# Patient Record
Sex: FEMALE | Race: WHITE | NOT HISPANIC OR LATINO | Employment: FULL TIME | URBAN - METROPOLITAN AREA
[De-identification: names, ages, dates, MRNs, and addresses within clinical notes are randomized per-mention and may not be internally consistent; named-entity substitution may affect disease eponyms.]

---

## 2019-01-14 ENCOUNTER — HOSPITAL ENCOUNTER (OUTPATIENT)
Dept: GASTROENTEROLOGY | Facility: HOSPITAL | Age: 49
Setting detail: HOSPITAL OUTPATIENT SURGERY
Discharge: HOME | End: 2019-01-14
Attending: INTERNAL MEDICINE

## 2019-01-14 LAB — HCG UR QL: NEGATIVE

## 2019-01-29 ENCOUNTER — CONVERSION ENCOUNTER (OUTPATIENT)
Dept: FAMILY MEDICINE CLINIC | Facility: CLINIC | Age: 49
End: 2019-01-29

## 2019-01-29 ENCOUNTER — OFFICE VISIT CONVERTED (OUTPATIENT)
Dept: FAMILY MEDICINE CLINIC | Facility: CLINIC | Age: 49
End: 2019-01-29
Attending: FAMILY MEDICINE

## 2019-02-12 ENCOUNTER — HOSPITAL ENCOUNTER (OUTPATIENT)
Dept: OTHER | Facility: HOSPITAL | Age: 49
Setting detail: RECURRING SERIES
Discharge: HOME | End: 2019-03-22
Attending: FAMILY MEDICINE

## 2019-04-30 ENCOUNTER — OFFICE VISIT CONVERTED (OUTPATIENT)
Dept: FAMILY MEDICINE CLINIC | Facility: CLINIC | Age: 49
End: 2019-04-30
Attending: FAMILY MEDICINE

## 2019-05-07 ENCOUNTER — HOSPITAL ENCOUNTER (OUTPATIENT)
Dept: MRI IMAGING | Facility: HOSPITAL | Age: 49
Discharge: HOME | End: 2019-05-07
Attending: FAMILY MEDICINE

## 2019-05-13 ENCOUNTER — HOSPITAL ENCOUNTER (OUTPATIENT)
Dept: FAMILY MEDICINE CLINIC | Facility: CLINIC | Age: 49
Discharge: HOME | End: 2019-05-13
Attending: FAMILY MEDICINE

## 2019-05-13 LAB
BASOPHILS # BLD AUTO: 0.04 10*3/UL (ref 0–0.2)
BASOPHILS NFR BLD AUTO: 0.8 % (ref 0–3)
CONV ABS IMM GRAN: 0.02 10*3/UL (ref 0–0.2)
CONV IMMATURE GRAN: 0.4 % (ref 0–1.8)
DEPRECATED RDW RBC AUTO: 50.7 FL (ref 36.4–46.3)
EOSINOPHIL # BLD AUTO: 0.1 10*3/UL (ref 0–0.7)
EOSINOPHIL # BLD AUTO: 2.1 % (ref 0–7)
ERYTHROCYTE [DISTWIDTH] IN BLOOD BY AUTOMATED COUNT: 13.9 % (ref 11.7–14.4)
FSH SERPL-ACNC: 13.3 M[IU]/ML
HBA1C MFR BLD: 13.8 G/DL (ref 12–16)
HCT VFR BLD AUTO: 42.6 % (ref 37–47)
LYMPHOCYTES # BLD AUTO: 1.14 10*3/UL (ref 1–5)
MCH RBC QN AUTO: 31.9 PG (ref 27–31)
MCHC RBC AUTO-ENTMCNC: 32.4 G/DL (ref 33–37)
MCV RBC AUTO: 98.6 FL (ref 81–99)
MONOCYTES # BLD AUTO: 0.55 10*3/UL (ref 0.2–1.2)
MONOCYTES NFR BLD AUTO: 11.4 % (ref 3–10)
NEUTROPHILS # BLD AUTO: 2.99 10*3/UL (ref 2–8)
NEUTROPHILS NFR BLD AUTO: 61.7 % (ref 30–85)
NRBC CBCN: 0 % (ref 0–0.7)
PLATELET # BLD AUTO: 283 10*3/UL (ref 130–400)
PMV BLD AUTO: 11.1 FL (ref 9.4–12.3)
RBC # BLD AUTO: 4.32 10*6/UL (ref 4.2–5.4)
VARIANT LYMPHS NFR BLD MANUAL: 23.6 % (ref 20–45)
WBC # BLD AUTO: 4.84 10*3/UL (ref 4.8–10.8)

## 2019-05-31 ENCOUNTER — HOSPITAL ENCOUNTER (OUTPATIENT)
Dept: GENERAL RADIOLOGY | Facility: HOSPITAL | Age: 49
Discharge: HOME | End: 2019-05-31
Attending: OBSTETRICS & GYNECOLOGY

## 2019-06-07 ENCOUNTER — HOSPITAL ENCOUNTER (OUTPATIENT)
Dept: GASTROENTEROLOGY | Facility: HOSPITAL | Age: 49
Setting detail: HOSPITAL OUTPATIENT SURGERY
Discharge: HOME | End: 2019-06-07
Attending: INTERNAL MEDICINE

## 2019-06-07 LAB — HCG UR QL: NEGATIVE

## 2019-06-14 ENCOUNTER — HOSPITAL ENCOUNTER (OUTPATIENT)
Dept: FAMILY MEDICINE CLINIC | Facility: CLINIC | Age: 49
Discharge: HOME | End: 2019-06-14
Attending: FAMILY MEDICINE

## 2019-06-14 LAB
25(OH)D3 SERPL-MCNC: 31.3 NG/ML (ref 30–100)
ALBUMIN SERPL-MCNC: 4.3 G/DL (ref 3.5–5)
ALBUMIN/GLOB SERPL: 1.4 {RATIO} (ref 1.4–2.6)
ALP SERPL-CCNC: 83 U/L (ref 53–141)
ALT SERPL-CCNC: 17 U/L (ref 10–40)
ANION GAP SERPL CALC-SCNC: 17 MMOL/L (ref 8–19)
AST SERPL-CCNC: 19 U/L (ref 15–50)
BILIRUB SERPL-MCNC: 1.07 MG/DL (ref 0.2–1.3)
BUN SERPL-MCNC: 12 MG/DL (ref 5–25)
BUN/CREAT SERPL: 15 {RATIO} (ref 6–20)
CALCIUM SERPL-MCNC: 8.8 MG/DL (ref 8.7–10.4)
CHLORIDE SERPL-SCNC: 102 MMOL/L (ref 99–111)
CHOLEST SERPL-MCNC: 195 MG/DL (ref 107–200)
CHOLEST/HDLC SERPL: 2.9 {RATIO} (ref 3–6)
CONV CO2: 25 MMOL/L (ref 22–32)
CONV TOTAL PROTEIN: 7.4 G/DL (ref 6.3–8.2)
CREAT UR-MCNC: 0.79 MG/DL (ref 0.5–0.9)
GFR SERPLBLD BASED ON 1.73 SQ M-ARVRAT: >60 ML/MIN/{1.73_M2}
GLOBULIN UR ELPH-MCNC: 3.1 G/DL (ref 2–3.5)
GLUCOSE SERPL-MCNC: 86 MG/DL (ref 65–99)
HDLC SERPL-MCNC: 67 MG/DL (ref 40–60)
LDLC SERPL CALC-MCNC: 115 MG/DL (ref 70–100)
OSMOLALITY SERPL CALC.SUM OF ELEC: 289 MOSM/KG (ref 273–304)
POTASSIUM SERPL-SCNC: 3.8 MMOL/L (ref 3.5–5.3)
SODIUM SERPL-SCNC: 140 MMOL/L (ref 135–147)
TRIGL SERPL-MCNC: 64 MG/DL (ref 40–150)
VLDLC SERPL-MCNC: 13 MG/DL (ref 5–37)

## 2019-06-26 ENCOUNTER — HOSPITAL ENCOUNTER (OUTPATIENT)
Dept: PERIOP | Facility: HOSPITAL | Age: 49
Setting detail: HOSPITAL OUTPATIENT SURGERY
Discharge: HOME | End: 2019-06-26
Attending: OBSTETRICS & GYNECOLOGY

## 2019-06-26 LAB
BASOPHILS # BLD AUTO: 0.04 10*3/UL (ref 0–0.2)
BASOPHILS NFR BLD AUTO: 0.8 % (ref 0–3)
CONV ABS IMM GRAN: 0.01 10*3/UL (ref 0–0.2)
CONV IMMATURE GRAN: 0.2 % (ref 0–1.8)
DEPRECATED RDW RBC AUTO: 47.8 FL (ref 36.4–46.3)
EOSINOPHIL # BLD AUTO: 0.06 10*3/UL (ref 0–0.7)
EOSINOPHIL # BLD AUTO: 1.2 % (ref 0–7)
ERYTHROCYTE [DISTWIDTH] IN BLOOD BY AUTOMATED COUNT: 13.2 % (ref 11.7–14.4)
HBA1C MFR BLD: 12.7 G/DL (ref 12–16)
HCG UR QL: NEGATIVE
HCT VFR BLD AUTO: 38.8 % (ref 37–47)
LYMPHOCYTES # BLD AUTO: 0.85 10*3/UL (ref 1–5)
MCH RBC QN AUTO: 31.8 PG (ref 27–31)
MCHC RBC AUTO-ENTMCNC: 32.7 G/DL (ref 33–37)
MCV RBC AUTO: 97.2 FL (ref 81–99)
MONOCYTES # BLD AUTO: 0.52 10*3/UL (ref 0.2–1.2)
MONOCYTES NFR BLD AUTO: 10.7 % (ref 3–10)
NEUTROPHILS # BLD AUTO: 3.36 10*3/UL (ref 2–8)
NEUTROPHILS NFR BLD AUTO: 69.5 % (ref 30–85)
NRBC CBCN: 0 % (ref 0–0.7)
PLATELET # BLD AUTO: 259 10*3/UL (ref 130–400)
PMV BLD AUTO: 10.2 FL (ref 9.4–12.3)
RBC # BLD AUTO: 3.99 10*6/UL (ref 4.2–5.4)
VARIANT LYMPHS NFR BLD MANUAL: 17.6 % (ref 20–45)
WBC # BLD AUTO: 4.84 10*3/UL (ref 4.8–10.8)

## 2019-07-30 ENCOUNTER — OFFICE VISIT CONVERTED (OUTPATIENT)
Dept: FAMILY MEDICINE CLINIC | Facility: CLINIC | Age: 49
End: 2019-07-30
Attending: FAMILY MEDICINE

## 2019-11-04 ENCOUNTER — OFFICE VISIT CONVERTED (OUTPATIENT)
Dept: FAMILY MEDICINE CLINIC | Facility: CLINIC | Age: 49
End: 2019-11-04
Attending: FAMILY MEDICINE

## 2020-02-04 ENCOUNTER — OFFICE VISIT CONVERTED (OUTPATIENT)
Dept: FAMILY MEDICINE CLINIC | Facility: CLINIC | Age: 50
End: 2020-02-04
Attending: FAMILY MEDICINE

## 2020-05-20 ENCOUNTER — OFFICE VISIT CONVERTED (OUTPATIENT)
Dept: FAMILY MEDICINE CLINIC | Facility: CLINIC | Age: 50
End: 2020-05-20
Attending: FAMILY MEDICINE

## 2020-06-03 ENCOUNTER — HOSPITAL ENCOUNTER (OUTPATIENT)
Dept: FAMILY MEDICINE CLINIC | Facility: CLINIC | Age: 50
Discharge: HOME | End: 2020-06-03
Attending: FAMILY MEDICINE

## 2020-06-03 LAB
25(OH)D3 SERPL-MCNC: 28.3 NG/ML (ref 30–100)
ALBUMIN SERPL-MCNC: 4.2 G/DL (ref 3.5–5)
ALBUMIN/GLOB SERPL: 1.4 {RATIO} (ref 1.4–2.6)
ALP SERPL-CCNC: 93 U/L (ref 53–141)
ALT SERPL-CCNC: 28 U/L (ref 10–40)
ANION GAP SERPL CALC-SCNC: 14 MMOL/L (ref 8–19)
AST SERPL-CCNC: 25 U/L (ref 15–50)
BASOPHILS # BLD AUTO: 0.05 10*3/UL (ref 0–0.2)
BASOPHILS NFR BLD AUTO: 1 % (ref 0–3)
BILIRUB SERPL-MCNC: 1.01 MG/DL (ref 0.2–1.3)
BUN SERPL-MCNC: 11 MG/DL (ref 5–25)
BUN/CREAT SERPL: 13 {RATIO} (ref 6–20)
CALCIUM SERPL-MCNC: 9 MG/DL (ref 8.7–10.4)
CHLORIDE SERPL-SCNC: 105 MMOL/L (ref 99–111)
CHOLEST SERPL-MCNC: 207 MG/DL (ref 107–200)
CHOLEST/HDLC SERPL: 2.7 {RATIO} (ref 3–6)
CONV ABS IMM GRAN: 0.01 10*3/UL (ref 0–0.2)
CONV CO2: 24 MMOL/L (ref 22–32)
CONV IMMATURE GRAN: 0.2 % (ref 0–1.8)
CONV TOTAL PROTEIN: 7.2 G/DL (ref 6.3–8.2)
CREAT UR-MCNC: 0.84 MG/DL (ref 0.5–0.9)
DEPRECATED RDW RBC AUTO: 48.2 FL (ref 36.4–46.3)
EOSINOPHIL # BLD AUTO: 0.04 10*3/UL (ref 0–0.7)
EOSINOPHIL # BLD AUTO: 0.8 % (ref 0–7)
ERYTHROCYTE [DISTWIDTH] IN BLOOD BY AUTOMATED COUNT: 13.2 % (ref 11.7–14.4)
GFR SERPLBLD BASED ON 1.73 SQ M-ARVRAT: >60 ML/MIN/{1.73_M2}
GLOBULIN UR ELPH-MCNC: 3 G/DL (ref 2–3.5)
GLUCOSE SERPL-MCNC: 97 MG/DL (ref 65–99)
HCT VFR BLD AUTO: 46.3 % (ref 37–47)
HDLC SERPL-MCNC: 77 MG/DL (ref 40–60)
HGB BLD-MCNC: 15.4 G/DL (ref 12–16)
LDLC SERPL CALC-MCNC: 120 MG/DL (ref 70–100)
LYMPHOCYTES # BLD AUTO: 1.17 10*3/UL (ref 1–5)
LYMPHOCYTES NFR BLD AUTO: 22.9 % (ref 20–45)
MCH RBC QN AUTO: 33.3 PG (ref 27–31)
MCHC RBC AUTO-ENTMCNC: 33.3 G/DL (ref 33–37)
MCV RBC AUTO: 100 FL (ref 81–99)
MONOCYTES # BLD AUTO: 0.62 10*3/UL (ref 0.2–1.2)
MONOCYTES NFR BLD AUTO: 12.1 % (ref 3–10)
NEUTROPHILS # BLD AUTO: 3.22 10*3/UL (ref 2–8)
NEUTROPHILS NFR BLD AUTO: 63 % (ref 30–85)
NRBC CBCN: 0 % (ref 0–0.7)
OSMOLALITY SERPL CALC.SUM OF ELEC: 285 MOSM/KG (ref 273–304)
PLATELET # BLD AUTO: 279 10*3/UL (ref 130–400)
PMV BLD AUTO: 11.2 FL (ref 9.4–12.3)
POTASSIUM SERPL-SCNC: 4.5 MMOL/L (ref 3.5–5.3)
RBC # BLD AUTO: 4.63 10*6/UL (ref 4.2–5.4)
SODIUM SERPL-SCNC: 138 MMOL/L (ref 135–147)
TRIGL SERPL-MCNC: 51 MG/DL (ref 40–150)
VLDLC SERPL-MCNC: 10 MG/DL (ref 5–37)
WBC # BLD AUTO: 5.11 10*3/UL (ref 4.8–10.8)

## 2020-09-14 ENCOUNTER — HOSPITAL ENCOUNTER (OUTPATIENT)
Dept: PREADMISSION TESTING | Facility: HOSPITAL | Age: 50
Discharge: HOME | End: 2020-09-14
Attending: INTERNAL MEDICINE

## 2020-09-15 LAB — SARS-COV-2 RNA SPEC QL NAA+PROBE: NOT DETECTED

## 2020-09-18 ENCOUNTER — HOSPITAL ENCOUNTER (OUTPATIENT)
Dept: GASTROENTEROLOGY | Facility: HOSPITAL | Age: 50
Setting detail: HOSPITAL OUTPATIENT SURGERY
Discharge: HOME | End: 2020-09-18
Attending: INTERNAL MEDICINE

## 2020-12-28 ENCOUNTER — OFFICE VISIT CONVERTED (OUTPATIENT)
Dept: FAMILY MEDICINE CLINIC | Facility: CLINIC | Age: 50
End: 2020-12-28
Attending: FAMILY MEDICINE

## 2021-05-13 NOTE — PROGRESS NOTES
Progress Note      Patient Name: Lauren Badillo   Patient ID: [id number]   Sex: Female   YOB: 1970    Primary Care Provider: Aly Mcdonald DO   Referring Provider: Aly Mcdonald DO    Visit Date: May 20, 2020    Provider: Aly Mcdonald DO   Location: [facility]   Location Address: [address] [place] [address], [address]  [id number]   Location Phone: [phone]          Chief Complaint  · check up      History Of Present Illness  Lauren Badillo is a 50 year old /White female who presents for evaluation and treatment of:      Patient presents today for checkup.  She reports that she stopped taking the phentermine.  She did not have much weight loss.  And reports being tired of taking the medication.  She appears to have lost about 4 pounds.  She reports that ever since she had her hysterectomy and oophorectomy.  She does not want to make any changes at this time.  She does report having some light vertigo symptoms.  She previously has had issues with this and was given exercises but it made her very nauseous.  She did describe that when she lays down in her bed on her right side that she feels like the room is spinning.  This will last about 10 seconds and then goes away.  She did have a positive [place]-Hallpike maneuver on the right today and was treated twice with the Epley maneuver with improvement but not resolution of her symptoms.  Patient was given home exercises.  Discussed seeing patient back in 6 months for checkup visit.  She does take tramadol for her bilateral buttock pain.  She has been to physical medicine and rehabilitation which did not help.  She will try acupuncture as well.       Past Medical History  Breast cancer screening; Bursitis; Cervical cancer screening; Colon cancer screening; Colon Polyps; Need for shingles vaccine         Past Surgical History  Colon Polyps; D&C (dilatation and curettage); Hysterectomy         Medication List  tramadol 50 mg  "oral tablet; Vitamin D2 50,000 unit oral capsule         Allergy List  NO KNOWN DRUG ALLERGIES         Family Medical History  Heart Disease; - No Family History of Colorectal Cancer         Reproductive History   2 Para 2 0 0 0       Social History  Alcohol (Never); lives with spouse; Tobacco (Current every day)         Review of Systems     Gen: Denies any fever, chills  HEENT: Denies any changes in vision or hearing, denies nasal congestion, denies any neck tenderness or lymphadenopathy  Extremities: Denies edema  Psychiatric: Denies any changes in mood or affect  Neurologic: As described above  Skin: Denies any rashes  Musculoskeletal: Chronic bilateral buttock pain       Vitals  Date Time BP Position Site L\R Cuff Size HR RR TEMP (F) WT  HT  BMI kg/m2 BSA m2 O2 Sat HC       2020 03:56 /74 Sitting    84 - R  96.8 184lbs 0oz 5'  7\" 28.82 1.99 96 %          Physical Examination     General: AAO 3, no acute distress, pleasant  HEENT: Normocephalic, atraumatic, no discharge in the eyes, and TMs intact bilaterally with no erythema, no cervical tenderness or lymphadenopathy  Cardiovascular: Regular rate and rhythm without appreciable murmur  Respiratory: Clear to auscultation bilaterally no RRW  Gastrointestinal: Soft nontender nondistended with bowel sounds present  extremities: No clubbing, cyanosis or edema  Neurologic: Positive [place]-Hallpike maneuver on the right   Psychiatric: Normal mood and affect           Assessment  · BPPV (benign paroxysmal positional vertigo), right     386.11/H81.11    Problems Reconciled  Plan  · Orders  o ACO-39: Current medications updated and reviewed () - - 2020  o ACO-18: Negative screen for clinical depression using a standardized tool () - - 2020   0 points  · Medications  o Medications have been Reconciled  o Transition of Care or Provider Policy  · Instructions  o Handouts were given to patient: Epley maneuver  o Patient was " educated/instructed on their diagnosis, treatment and medications prior to discharge from the clinic today.  o Patient instructed to seek medical attention urgently for new or worsening symptoms.  o Call the office with any concerns or questions.  o Treatment was discussed with patient today. Patient to call or return if she has any worsening symptoms given a handout today for the Epley maneuver which is modified so that she can do this at home 3 times a day until she has had symptom resolution for 24 hours. I will see her back in 6 months or sooner.  o Electronically Identified Patient Education Materials Provided Electronically  · Disposition  o Follow Up in 6 months.            Electronically Signed by: Aly Mcdonald DO -Author on May 20, 2020 05:41:03 PM

## 2021-05-14 VITALS
TEMPERATURE: 98.2 F | WEIGHT: 187.37 LBS | BODY MASS INDEX: 29.41 KG/M2 | SYSTOLIC BLOOD PRESSURE: 102 MMHG | HEIGHT: 67 IN | HEART RATE: 69 BPM | OXYGEN SATURATION: 97 % | DIASTOLIC BLOOD PRESSURE: 70 MMHG

## 2021-05-14 NOTE — PROGRESS NOTES
Progress Note      Patient Name: Lauren Badillo   Patient ID: [id number]   Sex: Female   YOB: 1970    Primary Care Provider: Aly Mcdonald DO   Referring Provider: Aly Mcdonald DO    Visit Date: 2020    Provider: Aly Mcdonald DO   [place] [facility] [place]   Location Address: [address] [place] [address], [address]  [place], [place]  [id number]   Location Phone: [phone]          Chief Complaint  · check up      History Of Present Illness  Lauren Badillo is a 50 year old /White female who presents for evaluation and treatment of:      Presents today for checkup.  She reports overall doing well.  She still deals with bilateral buttock pain which tramadol does help out with but does not resolve her symptoms.  She has tried various options including physical medicine rehabilitation but this has been disappointing for her.  Tramadol does help her symptoms and she is not requesting a refill at this time.  Last wrote her prescription on 2020 and she does still have a refill remaining so she uses it sparingly.  She did have colon polyps and does need a follow-up in 3 years.  Path report showed fragments of a tubular adenoma and hyperplastic polyp.  Discussed seeing her back in 6 months and having labs repeated prior to next appointment.       Past Medical History  Breast cancer screening; Bursitis; Cervical cancer screening; Colon cancer screening; Colon Polyps; Need for shingles vaccine         Past Surgical History  Colon Polyps; Colonoscopy; D&C (dilatation and curettage); Hysterectomy         Medication List  Caltrate 600 plus D 600 mg (1,500 mg)-800 unit oral tablet,chewable; tramadol 50 mg oral tablet; Vitamin D2 50,000 unit oral capsule         Allergy List  NO KNOWN DRUG ALLERGIES       Allergies Reconciled  Family Medical History  Heart Disease; - No Family History of Colorectal Cancer         Reproductive History   2 Para 2 0 0 0       Social  "History  Alcohol (Never); lives with spouse; Tobacco (Current every day)         Review of Systems     Gen: Denies any fever, chills, or weight changes  HEENT: Denies any changes in vision or hearing, denies nasal congestion, denies any neck tenderness or lymphadenopathy  Extremities: Denies edema  Psychiatric: Denies any changes in mood or affect  Neurologic: Denies any deficits  Skin: Denies any rashes  Musculoskeletal: As discussed above       Vitals  Date Time BP Position Site L\R Cuff Size HR RR TEMP (F) WT  HT  BMI kg/m2 BSA m2 O2 Sat FR L/min FiO2 HC       12/28/2020 02:36 /70 Sitting    69 - R  98.2 187lbs 6oz 5'  7\" 29.35 2 97 %            Physical Examination     General: AAO 3, no acute distress, pleasant  HEENT: Normocephalic, atraumatic  Cardiovascular: Regular rate and rhythm without appreciable murmur  Respiratory: Clear to auscultation bilaterally no RRW  Gastrointestinal: Soft nontender nondistended with bowel sounds present  extremities: No clubbing, cyanosis or edema  Neurologic: CN II through XII grossly intact   Psychiatric: Normal mood and affect           Assessment  · Vitamin D deficiency     268.9/E55.9  · Need for shingles vaccine     V04.89/Z23  · Colon polyp     211.3/K63.5  · Buttock pain     729.1/M79.18  · Medication monitoring encounter     V58.83/Z51.81  · HLD (hyperlipidemia)     272.4/E78.5      Plan  · Orders  o CBC with Auto Diff [facility] (75444) - V58.83/Z51.81, 272.4/E78.5 - 06/28/2021  o CMP [facility] (24048) - V58.83/Z51.81, 272.4/E78.5, 268.9/E55.9 - 06/28/2021  o Lipid Panel [facility] (70502) - V58.83/Z51.81, 272.4/E78.5 - 06/28/2021  o Vitamin D (25-Hydroxy) Level (54443) - V58.83/Z51.81, 268.9/E55.9 - 06/28/2021  o ACO-39: Current medications updated and reviewed (, 1159F) - - 12/28/2020  o ACO-14: Influenza immunization was not administered for reasons documented [facility] () - - 12/28/2020   Declines  · Medications  o Medications have been Reconciled  o Transition of Care or " Provider Policy  · Instructions  o Discussed the need for Shingles vaccination with patient.   o Patient was educated/instructed on their diagnosis, treatment and medications prior to discharge from the clinic today.  o Patient instructed to seek medical attention urgently for new or worsening symptoms.  o Call the office with any concerns or questions.  o Plan as documented above. Plan on seeing her back in 6 months or sooner if needed. I have given her prescription for Shingrix today to get done at a local pharmacy. I will have her labs repeated prior to next appointment in 6 months. She is instructed to call with any questions or concerns.  · Disposition  o Follow Up in 6 months.            Electronically Signed by: Aly Mcdonald DO -Author on December 28, 2020 03:18:26 PM

## 2021-05-15 VITALS
HEIGHT: 67 IN | WEIGHT: 184 LBS | HEART RATE: 84 BPM | TEMPERATURE: 96.8 F | BODY MASS INDEX: 28.88 KG/M2 | SYSTOLIC BLOOD PRESSURE: 120 MMHG | OXYGEN SATURATION: 96 % | DIASTOLIC BLOOD PRESSURE: 74 MMHG

## 2021-05-15 VITALS
WEIGHT: 183.37 LBS | BODY MASS INDEX: 28.78 KG/M2 | DIASTOLIC BLOOD PRESSURE: 72 MMHG | TEMPERATURE: 98.2 F | OXYGEN SATURATION: 96 % | SYSTOLIC BLOOD PRESSURE: 122 MMHG | HEIGHT: 67 IN | HEART RATE: 62 BPM

## 2021-05-15 VITALS
DIASTOLIC BLOOD PRESSURE: 78 MMHG | SYSTOLIC BLOOD PRESSURE: 118 MMHG | HEART RATE: 67 BPM | OXYGEN SATURATION: 99 % | WEIGHT: 180 LBS | BODY MASS INDEX: 28.25 KG/M2 | HEIGHT: 67 IN | TEMPERATURE: 98.3 F

## 2021-05-15 VITALS
BODY MASS INDEX: 29.21 KG/M2 | SYSTOLIC BLOOD PRESSURE: 126 MMHG | HEIGHT: 67 IN | WEIGHT: 186.12 LBS | OXYGEN SATURATION: 97 % | DIASTOLIC BLOOD PRESSURE: 82 MMHG | HEART RATE: 69 BPM | TEMPERATURE: 97.7 F

## 2021-05-15 VITALS
TEMPERATURE: 98.7 F | BODY MASS INDEX: 29.98 KG/M2 | HEART RATE: 73 BPM | HEIGHT: 67 IN | WEIGHT: 191 LBS | SYSTOLIC BLOOD PRESSURE: 124 MMHG | OXYGEN SATURATION: 97 % | DIASTOLIC BLOOD PRESSURE: 74 MMHG

## 2021-05-15 VITALS
BODY MASS INDEX: 29.53 KG/M2 | WEIGHT: 188.12 LBS | HEART RATE: 74 BPM | OXYGEN SATURATION: 97 % | HEIGHT: 67 IN | SYSTOLIC BLOOD PRESSURE: 122 MMHG | TEMPERATURE: 97.5 F | DIASTOLIC BLOOD PRESSURE: 80 MMHG

## 2021-08-02 ENCOUNTER — TELEPHONE (OUTPATIENT)
Dept: FAMILY MEDICINE CLINIC | Facility: CLINIC | Age: 51
End: 2021-08-02

## 2021-08-02 NOTE — TELEPHONE ENCOUNTER
Called patient to see if she could come in at 8 am instead of 7 on voicemail due to the doctor not being in until 8. HUB PLEASE READ.

## 2021-08-04 ENCOUNTER — OFFICE VISIT (OUTPATIENT)
Dept: FAMILY MEDICINE CLINIC | Facility: CLINIC | Age: 51
End: 2021-08-04

## 2021-08-04 VITALS
SYSTOLIC BLOOD PRESSURE: 102 MMHG | TEMPERATURE: 97.9 F | DIASTOLIC BLOOD PRESSURE: 78 MMHG | WEIGHT: 195.8 LBS | OXYGEN SATURATION: 96 % | HEIGHT: 67 IN | BODY MASS INDEX: 30.73 KG/M2 | HEART RATE: 57 BPM

## 2021-08-04 DIAGNOSIS — N39.3 STRESS INCONTINENCE: ICD-10-CM

## 2021-08-04 DIAGNOSIS — E55.9 VITAMIN D DEFICIENCY: ICD-10-CM

## 2021-08-04 DIAGNOSIS — Z51.81 MEDICATION MONITORING ENCOUNTER: ICD-10-CM

## 2021-08-04 DIAGNOSIS — M79.18 BILATERAL BUTTOCK PAIN: Primary | ICD-10-CM

## 2021-08-04 DIAGNOSIS — R33.9 URINARY RETENTION: ICD-10-CM

## 2021-08-04 DIAGNOSIS — E78.5 HYPERLIPIDEMIA, UNSPECIFIED HYPERLIPIDEMIA TYPE: ICD-10-CM

## 2021-08-04 LAB
AMPHET+METHAMPHET UR QL: NEGATIVE
AMPHETAMINE INTERNAL CONTROL: NORMAL
AMPHETAMINES UR QL: NEGATIVE
BARBITURATE INTERNAL CONTROL: NORMAL
BARBITURATES UR QL SCN: NEGATIVE
BASOPHILS # BLD AUTO: 0.03 10*3/MM3 (ref 0–0.2)
BASOPHILS NFR BLD AUTO: 0.7 % (ref 0–1.5)
BENZODIAZ UR QL SCN: NEGATIVE
BENZODIAZEPINE INTERNAL CONTROL: NORMAL
BUPRENORPHINE INTERNAL CONTROL: NORMAL
BUPRENORPHINE SERPL-MCNC: NEGATIVE NG/ML
CANNABINOIDS SERPL QL: NEGATIVE
COCAINE INTERNAL CONTROL: NORMAL
COCAINE UR QL: NEGATIVE
DEPRECATED RDW RBC AUTO: 43.2 FL (ref 37–54)
EOSINOPHIL # BLD AUTO: 0.09 10*3/MM3 (ref 0–0.4)
EOSINOPHIL NFR BLD AUTO: 2.2 % (ref 0.3–6.2)
ERYTHROCYTE [DISTWIDTH] IN BLOOD BY AUTOMATED COUNT: 12.3 % (ref 12.3–15.4)
EXPIRATION DATE: NORMAL
HCT VFR BLD AUTO: 43 % (ref 34–46.6)
HGB BLD-MCNC: 14.5 G/DL (ref 12–15.9)
IMM GRANULOCYTES # BLD AUTO: 0.01 10*3/MM3 (ref 0–0.05)
IMM GRANULOCYTES NFR BLD AUTO: 0.2 % (ref 0–0.5)
LYMPHOCYTES # BLD AUTO: 1.18 10*3/MM3 (ref 0.7–3.1)
LYMPHOCYTES NFR BLD AUTO: 28.5 % (ref 19.6–45.3)
Lab: NORMAL
MCH RBC QN AUTO: 32.6 PG (ref 26.6–33)
MCHC RBC AUTO-ENTMCNC: 33.7 G/DL (ref 31.5–35.7)
MCV RBC AUTO: 96.6 FL (ref 79–97)
MDMA (ECSTASY) INTERNAL CONTROL: NORMAL
MDMA UR QL SCN: NEGATIVE
METHADONE INTERNAL CONTROL: NORMAL
METHADONE UR QL SCN: NEGATIVE
METHAMPHETAMINE INTERNAL CONTROL: NORMAL
MONOCYTES # BLD AUTO: 0.41 10*3/MM3 (ref 0.1–0.9)
MONOCYTES NFR BLD AUTO: 9.9 % (ref 5–12)
NEUTROPHILS NFR BLD AUTO: 2.42 10*3/MM3 (ref 1.7–7)
NEUTROPHILS NFR BLD AUTO: 58.5 % (ref 42.7–76)
NRBC BLD AUTO-RTO: 0 /100 WBC (ref 0–0.2)
OPIATES INTERNAL CONTROL: NORMAL
OPIATES UR QL: NEGATIVE
OXYCODONE INTERNAL CONTROL: NORMAL
OXYCODONE UR QL SCN: NEGATIVE
PCP UR QL SCN: NEGATIVE
PHENCYCLIDINE INTERNAL CONTROL: NORMAL
PLATELET # BLD AUTO: 270 10*3/MM3 (ref 140–450)
PMV BLD AUTO: 10.4 FL (ref 6–12)
RBC # BLD AUTO: 4.45 10*6/MM3 (ref 3.77–5.28)
THC INTERNAL CONTROL: NORMAL
WBC # BLD AUTO: 4.14 10*3/MM3 (ref 3.4–10.8)

## 2021-08-04 PROCEDURE — 85025 COMPLETE CBC W/AUTO DIFF WBC: CPT | Performed by: FAMILY MEDICINE

## 2021-08-04 PROCEDURE — 82306 VITAMIN D 25 HYDROXY: CPT | Performed by: FAMILY MEDICINE

## 2021-08-04 PROCEDURE — 80053 COMPREHEN METABOLIC PANEL: CPT | Performed by: FAMILY MEDICINE

## 2021-08-04 PROCEDURE — 80305 DRUG TEST PRSMV DIR OPT OBS: CPT | Performed by: FAMILY MEDICINE

## 2021-08-04 PROCEDURE — 80061 LIPID PANEL: CPT | Performed by: FAMILY MEDICINE

## 2021-08-04 PROCEDURE — 99214 OFFICE O/P EST MOD 30 MIN: CPT | Performed by: FAMILY MEDICINE

## 2021-08-04 RX ORDER — OXYMETAZOLINE HCL 0.05 %
SPRAY, NON-AEROSOL (ML) NASAL
COMMUNITY
End: 2021-08-04

## 2021-08-04 RX ORDER — TRAMADOL HYDROCHLORIDE 50 MG/1
TABLET ORAL
COMMUNITY
End: 2021-08-04 | Stop reason: SDUPTHER

## 2021-08-04 RX ORDER — TRAMADOL HYDROCHLORIDE 50 MG/1
50 TABLET ORAL DAILY PRN
Qty: 30 TABLET | Refills: 1 | Status: SHIPPED | OUTPATIENT
Start: 2021-08-04

## 2021-08-04 NOTE — PROGRESS NOTES
"Chief Complaint  Buttock pain  Bladder issues  Requesting labs  Subjective          Lauren Badillo presents to [facility]  History of Present Illness  Presents today to discuss bilateral buttock pain.  She has had this issue for several years now.  She has previously been seen by orthopedics as well as physical medicine rehabilitation.  She has been disappointed with the results for treatment.  Tramadol does help out some but does not completely get rid of her symptoms.  She takes it very sparingly.  She is requesting refill today.  Farhad, UDS, and controlled substance agreement have been reviewed today and they are appropriate.  She is due for labs.  She also has issues with her bladder.  She reports that she has a hard time emptying completely.  Sometimes when she gets up she will have some leakage of urine as well.  She denies any urge incontinence.  Objective   Vital Signs:   /78   Pulse 57   Temp 97.9 °F (36.6 °C)   Ht 170.2 cm (67\")   Wt 88.8 kg (195 lb 12.8 oz)   SpO2 96%   BMI 30.67 kg/m²     Physical Exam   General: AAO ×3, no acute distress, pleasant  HEENT: Normocephalic, atraumatic  Cardiovascular: Regular rate and rhythm without appreciable murmur  Respiratory: Clear to auscultation bilaterally no RRW  Gastrointestinal: Soft nontender nondistended with bowel sounds present  extremities: No edema  Neurologic: CN II through XII grossly intact   Psychiatric: Normal mood and affect  Result Review :                 Assessment and Plan {CC Problem List  Visit Diagnosis   ROS  Review (Popup)  Health Maintenance  Quality  BestPractice  Medications  SmartSets  SnapShot Encounters  Media :23}   Diagnoses and all orders for this visit:    1. Medication monitoring encounter (Primary)  -     POC Urine Drug Screen [facility]  -     CBC & Differential  -     Comprehensive Metabolic Panel  -     Lipid Panel  -     Vitamin D 25 Hydroxy    2. Vitamin D " deficiency  -     CBC & Differential  -     Comprehensive Metabolic Panel  -     Lipid Panel  -     Vitamin D 25 Hydroxy    3. Hyperlipidemia, unspecified hyperlipidemia type  -     CBC & Differential  -     Comprehensive Metabolic Panel  -     Lipid Panel  -     Vitamin D 25 Hydroxy    4. Urinary retention  -     Ambulatory Referral to Urology    5. Stress incontinence  -     Ambulatory Referral to Urology    Other orders  -     traMADol (ULTRAM) 50 MG tablet; Take 1 tablet by mouth Daily As Needed for Severe Pain . Take 1 PO daily as needed for pain  Dispense: 30 tablet; Refill: 1    Discussed getting labs done today.  Farhad, UDS, and controlled substance agreement have been reviewed today and they are appropriate.  Discussed continue tramadol as needed for severe pain.  Further recommendations to follow once labs return.  I will refer her to urology for treatment for stress incontinence as well as incomplete emptying/urinary retention.     See note for indication of controlled substance.  Farhad and drug screen have been reviewed.  Controlled substance agreement signed and scanned into chart.  After discussion of risk and benefits of medication, I have determined the patient is suitable for Rx while demonstrating the ability to safely follow and administer the medication plan.  Patient understands expectation that medication directions cannot be adjusted without a providers written approval.    Follow Up   Return in about 6 months (around 2/4/2022) for chronic buttock pain.  Patient was given instructions and counseling regarding her condition or for health maintenance advice. Please see specific information pulled into the AVS if appropriate.

## 2021-08-05 LAB
25(OH)D3 SERPL-MCNC: 32.3 NG/ML
ALBUMIN SERPL-MCNC: 4.1 G/DL (ref 3.5–5.2)
ALBUMIN/GLOB SERPL: 1.4 G/DL
ALP SERPL-CCNC: 88 U/L (ref 39–117)
ALT SERPL W P-5'-P-CCNC: 34 U/L (ref 1–33)
ANION GAP SERPL CALCULATED.3IONS-SCNC: 8.3 MMOL/L (ref 5–15)
AST SERPL-CCNC: 22 U/L (ref 1–32)
BILIRUB SERPL-MCNC: 1 MG/DL (ref 0–1.2)
BUN SERPL-MCNC: 13 MG/DL (ref 6–20)
BUN/CREAT SERPL: 18.8 (ref 7–25)
CALCIUM SPEC-SCNC: 8.4 MG/DL (ref 8.6–10.5)
CHLORIDE SERPL-SCNC: 104 MMOL/L (ref 98–107)
CHOLEST SERPL-MCNC: 239 MG/DL (ref 0–200)
CO2 SERPL-SCNC: 23.7 MMOL/L (ref 22–29)
CREAT SERPL-MCNC: 0.69 MG/DL (ref 0.57–1)
GFR SERPL CREATININE-BSD FRML MDRD: 89 ML/MIN/1.73
GLOBULIN UR ELPH-MCNC: 2.9 GM/DL
GLUCOSE SERPL-MCNC: 83 MG/DL (ref 65–99)
HDLC SERPL-MCNC: 85 MG/DL (ref 40–60)
LDLC SERPL CALC-MCNC: 144 MG/DL (ref 0–100)
LDLC/HDLC SERPL: 1.68 {RATIO}
POTASSIUM SERPL-SCNC: 4.5 MMOL/L (ref 3.5–5.2)
PROT SERPL-MCNC: 7 G/DL (ref 6–8.5)
SODIUM SERPL-SCNC: 136 MMOL/L (ref 136–145)
TRIGL SERPL-MCNC: 58 MG/DL (ref 0–150)
VLDLC SERPL-MCNC: 10 MG/DL (ref 5–40)

## 2021-09-01 PROBLEM — K63.5 COLON POLYPS: Status: ACTIVE | Noted: 2021-09-01

## 2021-09-01 PROBLEM — Z12.4 CERVICAL CANCER SCREENING: Status: ACTIVE | Noted: 2018-06-01

## 2021-09-01 PROBLEM — M71.9 BURSITIS: Status: ACTIVE | Noted: 2021-09-01

## 2021-09-01 PROBLEM — Z23 NEED FOR SHINGLES VACCINE: Status: ACTIVE | Noted: 2021-09-01

## 2021-09-01 PROBLEM — Z12.39 BREAST SCREENING: Status: ACTIVE | Noted: 2018-09-01

## 2021-09-02 ENCOUNTER — OFFICE VISIT (OUTPATIENT)
Dept: FAMILY MEDICINE CLINIC | Facility: CLINIC | Age: 51
End: 2021-09-02

## 2021-09-02 VITALS
HEIGHT: 67 IN | WEIGHT: 199.2 LBS | OXYGEN SATURATION: 95 % | BODY MASS INDEX: 31.27 KG/M2 | HEART RATE: 63 BPM | DIASTOLIC BLOOD PRESSURE: 78 MMHG | TEMPERATURE: 97.9 F | SYSTOLIC BLOOD PRESSURE: 110 MMHG

## 2021-09-02 DIAGNOSIS — M12.811 ROTATOR CUFF ARTHROPATHY OF RIGHT SHOULDER: ICD-10-CM

## 2021-09-02 DIAGNOSIS — G89.29 CHRONIC RIGHT SHOULDER PAIN: Primary | ICD-10-CM

## 2021-09-02 DIAGNOSIS — M25.511 CHRONIC RIGHT SHOULDER PAIN: Primary | ICD-10-CM

## 2021-09-02 DIAGNOSIS — Z12.31 VISIT FOR SCREENING MAMMOGRAM: ICD-10-CM

## 2021-09-02 PROCEDURE — 99213 OFFICE O/P EST LOW 20 MIN: CPT | Performed by: FAMILY MEDICINE

## 2021-09-02 RX ORDER — TOPIRAMATE 25 MG/1
25 TABLET ORAL DAILY
Qty: 90 TABLET | Refills: 1 | Status: SHIPPED | OUTPATIENT
Start: 2021-09-02 | End: 2021-09-30

## 2021-09-02 NOTE — PROGRESS NOTES
"Chief Complaint  Right shoulder pain  Needs mammogram    Subjective          Lauren Badillo presents to [facility]  History of Present Illness  Patient presents today with complaints of right shoulder pain for the past 2 months.  She reports that she picks up her grandchild and thinks that this may have started her right shoulder pain.  She points to pain over the right anterior shoulder that radiates some down to her upper arm about residential.  She denies any sudden injury that would have caused this.  She is restricted when trying to lift her right arm overhead.  She does need a mammogram referral as well.  She reports that she will likely be moving to [place] in the next couple months.  She reports that her son is down there with her grandchildren and she would like to be close by and she can still work from home.  Patient also reports having a difficult time losing weight.  She states that she continues to steadily gain weight.  She weighs 199 pounds today which places her with a BMI of 31.20.  This is obesity class I.  Objective   Vital Signs:   /78   Pulse 63   Temp 97.9 °F (36.6 °C)   Ht 170.2 cm (67\")   Wt 90.4 kg (199 lb 3.2 oz)   SpO2 95%   BMI 31.20 kg/m²     Physical Exam   General: AAO ×3, no acute distress, pleasant  HEENT: Normocephalic, atraumatic  Musculoskeletal: Negative empty can test on the right with positive external rotation testing and positive [place] liftoff testing on the right.  Patient does have restricted range of motion of the right shoulder especially with abduction and flexion.  No mass or deformity appreciated.  Result Review :{Labs  Result Review  Imaging  Med Tab  Media  Procedures :23}                 Assessment and Plan    Diagnoses and all orders for this visit:    1. Chronic right shoulder pain (Primary)  -     MRI Shoulder Right Without Contrast; Future    2. Visit for screening mammogram  -     Mammo Screening Digital " Tomosynthesis Bilateral With CAD; Future    3. Rotator cuff arthropathy of right shoulder  -     MRI Shoulder Right Without Contrast; Future    Other orders  -     topiramate (Topamax) 25 MG tablet; Take 1 tablet by mouth Daily.  Dispense: 90 tablet; Refill: 1    Discussed with patient getting an MRI for further evaluation.  Further recommendations to follow once results return.  I will start her on Topamax for treatment of obesity class I.  She has previously been on phentermine but had some side effects from this.  We discussed other options however she would like to hold off at this time.  I will see her back as needed as she reports that she will likely be in [place] by the time her follow-up appointment comes around in February.    Follow Up   Return if symptoms worsen or fail to improve.  Patient was given instructions and counseling regarding her condition or for health maintenance advice. Please see specific information pulled into the AVS if appropriate.

## 2021-09-10 ENCOUNTER — TELEPHONE (OUTPATIENT)
Dept: FAMILY MEDICINE CLINIC | Facility: CLINIC | Age: 51
End: 2021-09-10

## 2021-09-20 ENCOUNTER — TELEPHONE (OUTPATIENT)
Dept: FAMILY MEDICINE CLINIC | Facility: CLINIC | Age: 51
End: 2021-09-20

## 2021-09-29 ENCOUNTER — HOSPITAL ENCOUNTER (OUTPATIENT)
Dept: MRI IMAGING | Facility: HOSPITAL | Age: 51
Discharge: HOME | End: 2021-09-29
Admitting: FAMILY MEDICINE

## 2021-09-29 DIAGNOSIS — G89.29 CHRONIC RIGHT SHOULDER PAIN: Primary | ICD-10-CM

## 2021-09-29 DIAGNOSIS — M25.511 CHRONIC RIGHT SHOULDER PAIN: Primary | ICD-10-CM

## 2021-09-29 DIAGNOSIS — G89.29 CHRONIC RIGHT SHOULDER PAIN: ICD-10-CM

## 2021-09-29 DIAGNOSIS — M12.811 ROTATOR CUFF ARTHROPATHY OF RIGHT SHOULDER: ICD-10-CM

## 2021-09-29 DIAGNOSIS — M25.511 CHRONIC RIGHT SHOULDER PAIN: ICD-10-CM

## 2021-09-29 PROCEDURE — 73221 MRI JOINT UPR EXTREM W/O DYE: CPT

## 2021-09-30 ENCOUNTER — OFFICE VISIT (OUTPATIENT)
Dept: UROLOGY | Facility: CLINIC | Age: 51
End: 2021-09-30

## 2021-09-30 VITALS — RESPIRATION RATE: 16 BRPM | HEIGHT: 67 IN | BODY MASS INDEX: 30.95 KG/M2 | WEIGHT: 197.2 LBS

## 2021-09-30 DIAGNOSIS — R39.15 URGENCY OF URINATION: Primary | ICD-10-CM

## 2021-09-30 PROBLEM — M70.70 ISCHIAL BURSITIS: Status: ACTIVE | Noted: 2021-09-30

## 2021-09-30 PROBLEM — M53.3 PAIN IN THE COCCYX: Status: ACTIVE | Noted: 2021-09-30

## 2021-09-30 PROBLEM — Z79.4 ENCOUNTER FOR LONG-TERM (CURRENT) USE OF INSULIN: Status: ACTIVE | Noted: 2018-09-01

## 2021-09-30 PROBLEM — M79.18 PAIN IN BUTTOCK: Status: ACTIVE | Noted: 2018-09-21

## 2021-09-30 PROBLEM — G57.00 PIRIFORMIS SYNDROME: Status: ACTIVE | Noted: 2018-10-19

## 2021-09-30 PROBLEM — M51.36 DEGENERATION OF LUMBAR INTERVERTEBRAL DISC: Status: ACTIVE | Noted: 2018-09-24

## 2021-09-30 PROBLEM — M47.817 LUMBOSACRAL SPONDYLOSIS WITHOUT MYELOPATHY: Status: ACTIVE | Noted: 2018-09-24

## 2021-09-30 LAB
BILIRUB BLD-MCNC: NEGATIVE MG/DL
CLARITY, POC: CLEAR
COLOR UR: YELLOW
GLUCOSE UR STRIP-MCNC: NEGATIVE MG/DL
KETONES UR QL: NEGATIVE
LEUKOCYTE EST, POC: NEGATIVE
NITRITE UR-MCNC: NEGATIVE MG/ML
PH UR: 5.5 [PH] (ref 5–8)
PROT UR STRIP-MCNC: NEGATIVE MG/DL
RBC # UR STRIP: NEGATIVE /UL
SP GR UR: 1.02 (ref 1–1.03)
UROBILINOGEN UR QL: NORMAL

## 2021-09-30 PROCEDURE — 81003 URINALYSIS AUTO W/O SCOPE: CPT | Performed by: NURSE PRACTITIONER

## 2021-09-30 PROCEDURE — 99203 OFFICE O/P NEW LOW 30 MIN: CPT | Performed by: NURSE PRACTITIONER

## 2021-09-30 RX ORDER — MELATONIN: COMMUNITY

## 2021-09-30 RX ORDER — OXYBUTYNIN CHLORIDE 5 MG/1
5 TABLET, EXTENDED RELEASE ORAL DAILY
Qty: 30 TABLET | Refills: 2 | Status: SHIPPED | OUTPATIENT
Start: 2021-09-30

## 2021-09-30 NOTE — PROGRESS NOTES
"Chief Complaint: Urinary Urgency (have stress incontenence and have the urge to go ) and Urinary Incontinence    Subjective         History of Present Illness  Lauren Badillo is a 51 y.o. female presents to [facility] to be seen for urinary incontinence x 3-4  Months.    She states she is with urgency with urination.    She will have leakage when bending over.     She does not have leakage with cough laugh or sneeze.     She will go to the restroom and go until she feels as if she is finished but then when she stands she will feel the urge to go again.    She had a hysterectomy about 18 months ago and has gained about 30lbs since then.     She wears pads.    She drinks 4-5 cups of 1/2 caf coffee a day, she drinks 1 sweet tea a day and water.     She voids every hour to hour and a half.     Nocturia x 1   Objective     Past Medical History:   Diagnosis Date   • Breast cancer screening 09/2018    NORMAL PER PATIENT 5/3/19-BI RADS 2   • Bursitis    • Cervical cancer screening 06/2018    NORMAL PER PATIENT   • Colon cancer screening     8/2018 6\" POLYP REMOVED. REPEATED 1/2019 1.5\" POLYP REMOVED SAME AREA. REPEATING MAY 2019; 6/7/19 POLYPECTOMY OF 2 MM POLYPOID FRAGMENT OF ASCENDING COLON AND 25MM POLYP IN HEPATIC FLEXURE. REPEAT COLONOSCOPY IN 1 YEAR-DR LINDSEY   • Colon polyps        Past Surgical History:   Procedure Laterality Date   • COLONOSCOPY W/ POLYPECTOMY  2018   • DILATATION AND CURETTAGE  06/29/2019    DR MELENDEZ   • HYSTERECTOMY  2019    MAINTAINS ON OVARY   • ROTATOR CUFF REPAIR           Current Outpatient Medications:   •  cholecalciferol (Cholecalciferol) 25 MCG (1000 UT) tablet, Vitamin D3 25 mcg (1,000 unit) tablet, Disp: , Rfl:   •  oxybutynin XL (DITROPAN-XL) 5 MG 24 hr tablet, Take 1 tablet by mouth Daily., Disp: 30 tablet, Rfl: 2  •  traMADol (ULTRAM) 50 MG tablet, Take 1 tablet by mouth Daily As Needed for Severe Pain . Take 1 PO daily as needed for pain, Disp: 30 " "tablet, Rfl: 1    No Known Allergies     Family History   Problem Relation Age of Onset   • Heart disease Father        Social History     Socioeconomic History   • Marital status:      Spouse name: Not on file   • Number of children: Not on file   • Years of education: Not on file   • Highest education level: Not on file   Tobacco Use   • Smoking status: Former Smoker   • Smokeless tobacco: Never Used   Vaping Use   • Vaping Use: Never used   Substance and Sexual Activity   • Alcohol use: Never       Vital Signs:   Resp 16   Ht 170.2 cm (67\")   Wt 89.4 kg (197 lb 3.2 oz)   BMI 30.89 kg/m²      Physical Exam  Genitourinary:     Urethra: No prolapse, urethral pain or urethral swelling.      Comments: Mild grade 0/1 cystocele present with Valsalva.         Result Review :   The following data was reviewed by: ARIAN Pearson on 09/30/2021:  Results for orders placed or performed in visit on 09/30/21   POC Urinalysis Dipstick, Automated    Specimen: Urine   Result Value Ref Range    Color Yellow Yellow, Straw, Dark Yellow, Kina    Clarity, UA Clear Clear    Specific Gravity  1.025 1.005 - 1.030    pH, Urine 5.5 5.0 - 8.0    Leukocytes Negative Negative    Nitrite, UA Negative Negative    Protein, POC Negative Negative mg/dL    Glucose, UA Negative Negative, 1000 mg/dL (3+) mg/dL    Ketones, UA Negative (A) Negative    Urobilinogen, UA Normal Normal    Bilirubin Negative Negative    Blood, UA Negative Negative          Procedures        Assessment and Plan    Diagnoses and all orders for this visit:    1. Urgency of urination (Primary)  -     POC Urinalysis Dipstick, Automated  -     oxybutynin XL (DITROPAN-XL) 5 MG 24 hr tablet; Take 1 tablet by mouth Daily.  Dispense: 30 tablet; Refill: 2      Overactive bladder-discussed with patient at length, all questions addressed. Discussed with patient that urinary urgency and frequency due to overactive bladder is a common condition that is multifactorial in " "nature, frequently difficult to treat, unlikely to cure, and management is dictated by patient motivation to improve and cope with symptoms.     - Initiate first-line conservative therapy; behavioral modifications including bladder training via timed and double voiding; fluid management, limiting fluids prior to sleep, and voiding immediately prior to sleep.  Discussed the utility of pelvic floor exercises recommended for OAB, patient to perform pelvic floor \"quick flicks\" to  at onset of urgency.  Technique discussed.  -Patient also interested in trial of medication for OAB.  Will initiate second line therapy via trial of anticholinergic, oxybutynin ER, prescribed.  Patient to take for 4-6 weeks to ensure adequate trial.  Side effects of this class discussed with patient including but not limited to dry mouth and constipation.     Patient was provided educational handouts on OAB.  Follow-up 4-6 weeks.      I spent 15 minutes caring for Lauren on this date of service. This time includes time spent by me in the following activities:reviewing tests, obtaining and/or reviewing a separately obtained history, performing a medically appropriate examination and/or evaluation , counseling and educating the patient/family/caregiver, ordering medications, tests, or procedures, and documenting information in the medical record  Follow Up   Return in about 6 weeks (around 11/11/2021) for With Dr. Sy for follow-up OAB.  Patient was given instructions and counseling regarding her condition or for health maintenance advice. Please see specific information pulled into the AVS if appropriate.         This document has been electronically signed by ARIAN Pearson  September 30, 2021 16:47 EDT       "

## 2021-10-07 ENCOUNTER — OFFICE VISIT (OUTPATIENT)
Dept: ORTHOPEDIC SURGERY | Facility: CLINIC | Age: 51
End: 2021-10-07

## 2021-10-07 VITALS — OXYGEN SATURATION: 96 % | WEIGHT: 202.6 LBS | BODY MASS INDEX: 31.8 KG/M2 | HEIGHT: 67 IN | HEART RATE: 97 BPM

## 2021-10-07 DIAGNOSIS — M75.81 ROTATOR CUFF TENDINITIS, RIGHT: ICD-10-CM

## 2021-10-07 DIAGNOSIS — M75.51 BURSITIS OF RIGHT SHOULDER: Primary | ICD-10-CM

## 2021-10-07 PROCEDURE — 99204 OFFICE O/P NEW MOD 45 MIN: CPT | Performed by: ORTHOPAEDIC SURGERY

## 2021-10-07 PROCEDURE — 20610 DRAIN/INJ JOINT/BURSA W/O US: CPT | Performed by: ORTHOPAEDIC SURGERY

## 2021-10-07 RX ORDER — MULTIVIT WITH MINERALS/LUTEIN
250 TABLET ORAL DAILY
COMMUNITY

## 2021-10-07 RX ORDER — DICLOFENAC SODIUM 75 MG/1
75 TABLET, DELAYED RELEASE ORAL 2 TIMES DAILY
Qty: 60 TABLET | Refills: 1 | Status: SHIPPED | OUTPATIENT
Start: 2021-10-07

## 2021-10-07 RX ADMIN — LIDOCAINE HYDROCHLORIDE 9 ML: 10 INJECTION, SOLUTION INFILTRATION; PERINEURAL at 16:05

## 2021-10-07 RX ADMIN — METHYLPREDNISOLONE ACETATE 80 MG: 80 INJECTION, SUSPENSION INTRA-ARTICULAR; INTRALESIONAL; INTRAMUSCULAR; SOFT TISSUE at 16:05

## 2021-10-07 NOTE — PROGRESS NOTES
"Chief Complaint  Pain of the Right Shoulder     Subjective      Lauren Badillo presents to [facility] for an evaluation of right shoulder. With no known injury or trauma she started having right shoulder pain that started in June. She states pain has progressively gotten worse over time. She hasn't had any formal treatment for her right shoulder. Her PCP ordered an MRI of the right shoulder and referred her to Orthopedics. She states decreasing motion and increasing pain.     No Known Allergies     Social History     Socioeconomic History   • Marital status:      Spouse name: Not on file   • Number of children: Not on file   • Years of education: Not on file   • Highest education level: Not on file   Tobacco Use   • Smoking status: Former Smoker   • Smokeless tobacco: Never Used   Vaping Use   • Vaping Use: Never used   Substance and Sexual Activity   • Alcohol use: Never        Review of Systems     Objective   Vital Signs:   Pulse 97   Ht 170.2 cm (67\")   Wt 91.9 kg (202 lb 9.6 oz)   SpO2 96%   BMI 31.73 kg/m²       Physical Exam  Constitutional:       Appearance: Normal appearance. Patient is well-developed and normal weight.   HENT:      Head: Normocephalic.      Right Ear: Hearing and external ear normal.      Left Ear: Hearing and external ear normal.      Nose: Nose normal.   Eyes:      Conjunctiva/sclera: Conjunctivae normal.   Cardiovascular:      Rate and Rhythm: Normal rate.   Pulmonary:      Effort: Pulmonary effort is normal.      Breath sounds: No wheezing or rales.   Abdominal:      Palpations: Abdomen is soft.      Tenderness: There is no abdominal tenderness.   Musculoskeletal:      Cervical back: Normal range of motion.   Skin:     Findings: No rash.   Neurological:      Mental Status: Patient is alert and oriented to person, place, and time.   Psychiatric:         Mood and Affect: Mood and affect normal.         Judgment: Judgment normal.       Ortho Exam  "     RIGHT SHOULDER: Good tone of deltoid, biceps, triceps, wrist extensors, and wrist flexors.  Full cervical range of motion. Radial pulse 2+, ulnar pulse 2+. Full elbow flexion and extension. Sensation grossly intact. Neurovascular intact.  Skin intact. No swelling, skin discoloration or atrophy. Forward elevation 70 degrees with pain. Abduction to 50 degrees. IR to L5. ER to 60 degrees. Positive impingement signs.       Large Joint Arthrocentesis: R subacromial bursa  Date/Time: 10/7/2021 4:05 PM  Consent given by: patient  Site marked: site marked  Timeout: Immediately prior to procedure a time out was called to verify the correct patient, procedure, equipment, support staff and site/side marked as required   Supporting Documentation  Indications: pain   Procedure Details  Location: shoulder - R subacromial bursa  Preparation: Patient was prepped and draped in the usual sterile fashion  Needle gauge: 21g.  Medications administered: 9 mL lidocaine 1 %; 80 mg methylPREDNISolone acetate 80 MG/ML  Patient tolerance: patient tolerated the procedure well with no immediate complications            Imaging Results (Most Recent)     None           Result Review :       MRI Shoulder Right Without Contrast    Result Date: 9/29/2021  Narrative: PROCEDURE: MRI SHOULDER RIGHT WO CONTRAST  COMPARISON: None  INDICATIONS: RIGHT SHOULDER PAIN  WITH POOR RANGE OF MOTION X 3 MONTHS AFTER A LIFTING INJURY.      TECHNIQUE: A variety of imaging planes and parameters were utilized for visualization of suspected pathology.  Images were performed without contrast.   FINDINGS:  Rotator cuff tendinosis is present with intermediate signal changes and thickening.  There is bursal sided fraying of the supraspinatus tendon.  There is partial articular sided tearing of the superior to mid fibers of the subscapularis tendon.  No additional tear identified.  There is a small amount of subacromial/subdeltoid bursitis present.  Mild to moderate  degenerative changes are present within the acromioclavicular and glenohumeral joints.  No significant joint effusion identified.  The biceps tendon appears intact.  Fluid is seen along the extracapsular portion of the biceps tendon.  Pan labral degenerative tearing is present.  No evidence of a focal displaced tear.  Cystic changes are seen along the humeral neck laterally which appear to be extending from the inferior portion of the teres minor muscle.  The cystic changes measure up to 1.9 cm in craniocaudal pleural dimension and up to 1.9 cm in AP dimension and approximately 9 mm in transverse dimension.  This may be contiguous with the subacromial/subdeltoid bursa.. No abnormal focal bone marrow signal is seen. The cortical margins are intact. The volume of the rotator cuff musculature is within normal limits. The surrounding soft tissues are unremarkable.  CONCLUSION:  1. Rotator cuff tendinosis with partial are articular sided tearing of the superior to mid fibers of the subscapularis tendon with bursal sided tearing of the supraspinatus tendon.  No evidence of complete tear. 2. Cystic changes seen along the lateral aspect of the humeral head which appear to be arising from the inferior muscular fibers of the teres minor muscle which may be related to an intramuscular tear with cyst formation.  Alternatively this may be contiguous with the subacromial bursa which demonstrates a small amount of fluid and represent a loculated component of bursitis. 3. Mild to moderate acromioclavicular and glenohumeral osteoarthritis. 4. Mild pan labral degenerative tearing with no evidence of a focal displaced tear.      PREM CARREON MD       Electronically Signed and Approved By: PREM CARREON MD on 9/29/2021 at 9:16                     Assessment and Plan     DX: Right shoulder bursitis  Right rotator cuff tendinitis     Injections, therapy, medication, and at home exercises discussed. Patient agrees with these measures. She  was given a prescription for PT. She was given at home exercises as well. A right shoulder injection given, she tolerated this well. She was also prescribed an anti-inflammatory.     Call or return if worsening symptoms.    Follow Up     4-6 weeks.       Patient was given instructions and counseling regarding her condition or for health maintenance advice. Please see specific information pulled into the AVS if appropriate.     Scribed for Yary Kern MD by Isa Lu.  10/07/21   15:58 EDT        I have personally performed the services described in this document as scribed by the above individual and it is both accurate and complete. Yary Kern MD 10/08/21

## 2021-10-08 RX ORDER — METHYLPREDNISOLONE ACETATE 80 MG/ML
80 INJECTION, SUSPENSION INTRA-ARTICULAR; INTRALESIONAL; INTRAMUSCULAR; SOFT TISSUE
Status: COMPLETED | OUTPATIENT
Start: 2021-10-07 | End: 2021-10-07

## 2021-10-08 RX ORDER — LIDOCAINE HYDROCHLORIDE 10 MG/ML
9 INJECTION, SOLUTION INFILTRATION; PERINEURAL
Status: COMPLETED | OUTPATIENT
Start: 2021-10-07 | End: 2021-10-07

## 2021-11-01 ENCOUNTER — TELEPHONE (OUTPATIENT)
Dept: FAMILY MEDICINE CLINIC | Facility: CLINIC | Age: 51
End: 2021-11-01

## 2021-11-01 DIAGNOSIS — Z01.419 WOMEN'S ANNUAL ROUTINE GYNECOLOGICAL EXAMINATION: Primary | ICD-10-CM

## 2021-11-01 NOTE — TELEPHONE ENCOUNTER
HUB WAS UNABLE TO WARM TRANSFER     Caller: Lauren Badillo    Relationship: Self    Best call back number: [phone]    What is the medical concern/diagnosis: ANNUAL WOMEN'S VISIT     What specialty or service is being requested: PAP     What is the provider, practice or medical service name: [facility]    What is the office location: [address] [place] [address] [address], [place] [address]    What is the office phone number:  [phone]    Any additional details: PATIENT STATES THAT PREVIOUS REFERRAL HAD .

## 2021-11-02 ENCOUNTER — TREATMENT (OUTPATIENT)
Dept: PHYSICAL THERAPY | Facility: CLINIC | Age: 51
End: 2021-11-02

## 2021-11-02 DIAGNOSIS — M75.51 BURSITIS OF RIGHT SHOULDER: Primary | ICD-10-CM

## 2021-11-02 DIAGNOSIS — M25.511 RIGHT SHOULDER PAIN, UNSPECIFIED CHRONICITY: ICD-10-CM

## 2021-11-02 PROCEDURE — 97161 PT EVAL LOW COMPLEX 20 MIN: CPT | Performed by: PHYSICAL THERAPIST

## 2021-11-02 NOTE — PROGRESS NOTES
Physical Therapy Initial Evaluation and Plan of Care      Patient: Lauren Badillo   : 1970  Diagnosis/ICD-10 Code:  Bursitis of right shoulder [M75.51]  Referring practitioner: Yary Kern MD  Date of Initial Visit: 2021  Today's Date: 2021  Patient seen for 1 sessions           Subjective Questionnaire: QuickDASH: 29=40-59% limitation      Subjective Evaluation    History of Present Illness  Mechanism of injury: Patient is a 51 yr old female referred to physical therapy with diagnosis of bursitis of right shoulder.  Patient reports right shoulder pain started in 2021.  Patient states difficulty with reaching up and pushing up with right arm.  Patient is right hand dominate.    Pain  At best pain ratin  At worst pain ratin  Quality: dull ache, radiating and sharp  Relieving factors: medications, change in position, ice and heat    Patient Goals  Patient goals for therapy: decreased pain, increased strength and increased motion             Objective          Active Range of Motion   Left Shoulder   Flexion: 140 degrees   Abduction: 80 degrees   External rotation 45°: WFL  Internal rotation 45°: WFL    Strength/Myotome Testing     Right Shoulder     Planes of Motion   Flexion: 4-   Extension: 4   Abduction: 3   External rotation at 0°: 4-   Internal rotation at 0°: 4-           Assessment & Plan     Assessment  Impairments: abnormal or restricted ROM, activity intolerance, impaired physical strength, lacks appropriate home exercise program and pain with function  Assessment details: Pt presents with limitations, noted by evaluation that impede patient's ability to use right shoulder/UE for ADLs.  The skills of a therapist will be required to safely and effectively implement the following treatment plan to restore maximal level of function.    Prognosis: good  Functional Limitations: carrying objects, lifting, uncomfortable because of pain and reaching overhead  Goals  Plan Goals: 1.  The patient has limited ROM of the right shoulder.    LTG 1: 8 weeks:  The patient will demonstrate 160 degrees of right shoulder flexion, 140 degrees of shoulder abduction  to allow the patient to reach into upper kitchen cabinets.   STATUS:  New   STG 1a: 4 weeks:  The patient will demonstrate 150 degrees of right shoulder flexion, 100 degrees of shoulder abduction.  STATUS:  New      2. The patient has limited strength of the right shoulder.   LTG 2: 8 weeks:  The patient will demonstrate 4+/5 strength for right shoulder flexion, abduction, external rotation, and internal rotation in order to demonstrate improved shoulder stability.   STATUS:  New   STG 2a: 4 weeks:  The patient will demonstrate 4/5 strength for right shoulder flexion, abduction, external rotation, and internal rotation.   STATUS:  New   STG2b:  2 weeks:  The patient will be independent with home exercises.    STATUS:  New      3. The patient complains of pain to the right shoulder.   LTG 3: 8 weeks:  The patient will report a pain rating of 2/10 or better in order to improve sleep quality and tolerance to performance of activities of daily living.   STATUS:  New   STG 3a: 4 weeks:  The patient will report a pain rating of 4/10 or better.    STATUS:  New      4. Carrying, Moving, and Handling Objects Functional Limitation     LTG 4: 8 weeks:  The patient will demonstrate 1-19% limitation by achieving a score of 15 on the Quick DASH.   STATUS:  New   STG 4a: 4 weeks:  The patient will demonstrate 20-39% limitation by achieving a score of 24 on the Quick DASH.     STATUS:  New     TREATMENT: Manual therapy, therapeutic exercise, home exercise instruction, and modalities as needed to include: electrical stimulation, ultrasound, moist heat, and ice.       Plan  Therapy options: will be seen for skilled physical therapy services  Planned modality interventions: cryotherapy, TENS and thermotherapy (hydrocollator packs)  Planned therapy interventions:  strengthening, spinal/joint mobilization, manual therapy, functional ROM exercises, home exercise program, flexibility and postural training  Frequency: 2x week  Duration in weeks: 4  Treatment plan discussed with: patient        Visit Diagnoses:    ICD-10-CM ICD-9-CM   1. Bursitis of right shoulder  M75.51 726.10   2. Right shoulder pain, unspecified chronicity  M25.511 719.41       Timed:  Manual Therapy:         mins  96146;  Therapeutic Exercise:         mins  45665;     Neuromuscular Damaso:        mins  67618;    Therapeutic Activity:          mins  04203;     Gait Training:           mins  13172;     Ultrasound:          mins  99525;    Electrical Stimulation:         mins  06889 ( );    Untimed:  Electrical Stimulation:         mins  14708 ( );  Mechanical Traction:         mins  05602;   PT evaluation   40 mins    Timed Treatment:      mins   Total Treatment:     40   mins    PT SIGNATURE: Delphine Hughes PT     Electronically singed 11/2/2021    [place] PT license: [id number]        Initial Certification  Certification Period: 11/2/2021 thru 1/30/2022  I certify that the therapy services are furnished while this patient is under my care.  The services outlined above are required by this patient, and will be reviewed every 90 days.     PHYSICIAN: Yary Kern MD      DATE:     Please sign and return via fax to [phone]  Thank you, [facility].

## 2021-11-09 ENCOUNTER — TREATMENT (OUTPATIENT)
Dept: PHYSICAL THERAPY | Facility: CLINIC | Age: 51
End: 2021-11-09

## 2021-11-09 DIAGNOSIS — M75.51 BURSITIS OF RIGHT SHOULDER: Primary | ICD-10-CM

## 2021-11-09 DIAGNOSIS — M25.511 RIGHT SHOULDER PAIN, UNSPECIFIED CHRONICITY: ICD-10-CM

## 2021-11-09 PROCEDURE — 97140 MANUAL THERAPY 1/> REGIONS: CPT | Performed by: PHYSICAL THERAPIST

## 2021-11-09 PROCEDURE — 97110 THERAPEUTIC EXERCISES: CPT | Performed by: PHYSICAL THERAPIST

## 2021-11-09 NOTE — PROGRESS NOTES
Physical Therapy Daily Treatment Note      Patient: Lauren Badillo   : 1970  Referring practitioner: Yary Kern MD  Date of Initial Visit: Type: THERAPY  Noted: 2021  Today's Date: 2021  Patient seen for 2 sessions           Subjective Questionnaire:       Subjective Evaluation    History of Present Illness    Subjective comment: Pt reported 3/10 pain increases to 6-7/10 pain with reaching up.  Pt stated she feel a lot of pain in her triceps and biceps area. Pain  Current pain rating: 3           Objective   See Exercise, Manual, and Modality Logs for complete treatment.       Assessment & Plan     Assessment  Assessment details: Continue with POC to improve pt's RUE funtion without pain.  Pt reported pain with passive ABD.        Visit Diagnoses:    ICD-10-CM ICD-9-CM   1. Bursitis of right shoulder  M75.51 726.10   2. Right shoulder pain, unspecified chronicity  M25.511 719.41       Progress per Plan of Care and Progress strengthening /stabilization /functional activity           Timed:  Manual Therapy:    10     mins  88053;  Therapeutic Exercise:    17     mins  43541;     Neuromuscular Damaso:        mins  71938;    Therapeutic Activity:          mins  59641;     Gait Training:           mins  01220;     Ultrasound:          mins  92624;    Electrical Stimulation:         mins  41107 ( );  Aquatic Therapy          mins  01479    Untimed:  Electrical Stimulation:         mins  56307 ( );  Mechanical Traction:         mins  81532;     Timed Treatment:   27   mins   Total Treatment:     27   mins    Electronically signed    Anne Ramos PTA  Physical Therapist Assistant    [employer] license: [id number]

## 2021-11-15 ENCOUNTER — TREATMENT (OUTPATIENT)
Dept: PHYSICAL THERAPY | Facility: CLINIC | Age: 51
End: 2021-11-15

## 2021-11-15 DIAGNOSIS — M25.511 RIGHT SHOULDER PAIN, UNSPECIFIED CHRONICITY: ICD-10-CM

## 2021-11-15 DIAGNOSIS — M75.51 BURSITIS OF RIGHT SHOULDER: Primary | ICD-10-CM

## 2021-11-15 PROCEDURE — 97140 MANUAL THERAPY 1/> REGIONS: CPT | Performed by: PHYSICAL THERAPIST

## 2021-11-15 PROCEDURE — 97110 THERAPEUTIC EXERCISES: CPT | Performed by: PHYSICAL THERAPIST

## 2021-11-15 NOTE — PROGRESS NOTES
Physical Therapy Daily Treatment Note      Patient: Lauren Badillo   : 1970  Referring practitioner: Yary Kern MD  Date of Initial Visit: Type: THERAPY  Noted: 2021  Today's Date: 11/15/2021  Patient seen for 3 sessions           Subjective   Lauren Badillo reports: right shoulder pain 3-410; achey    Objective   See Exercise, Manual, and Modality Logs for complete treatment.       Assessment/Plan    Visit Diagnoses:    ICD-10-CM ICD-9-CM   1. Bursitis of right shoulder  M75.51 726.10   2. Right shoulder pain, unspecified chronicity  M25.511 719.41       Progress per Plan of Care           Timed:  Manual Therapy:    10     mins  25314;  Therapeutic Exercise:    13     mins  27907;     Neuromuscular Damaso:        mins  92039;    Therapeutic Activity:          mins  43311;     Gait Training:           mins  15227;     Ultrasound:          mins  77158;    Electrical Stimulation:         mins  20544 ( );    Untimed:  Electrical Stimulation:         mins  89226 ( );  Mechanical Traction:         mins  05615;     Timed Treatment:   23   mins   Total Treatment:     23   mins  Delphine Hughes PT    Electronically singed 11/15/2021      [place] PT license: [id number]  Physical Therapist

## 2021-11-17 ENCOUNTER — TREATMENT (OUTPATIENT)
Dept: PHYSICAL THERAPY | Facility: CLINIC | Age: 51
End: 2021-11-17

## 2021-11-17 DIAGNOSIS — M25.511 RIGHT SHOULDER PAIN, UNSPECIFIED CHRONICITY: ICD-10-CM

## 2021-11-17 DIAGNOSIS — M75.51 BURSITIS OF RIGHT SHOULDER: Primary | ICD-10-CM

## 2021-11-17 PROCEDURE — 97140 MANUAL THERAPY 1/> REGIONS: CPT | Performed by: PHYSICAL THERAPIST

## 2021-11-17 PROCEDURE — 97110 THERAPEUTIC EXERCISES: CPT | Performed by: PHYSICAL THERAPIST

## 2021-11-17 NOTE — PROGRESS NOTES
Physical Therapy Daily Progress Note        Patient: Lauren Badillo   : 1970  Diagnosis/ICD-10 Code:  Bursitis of right shoulder [M75.51]  Referring practitioner: Yary Kern MD  Date of Initial Visit: Type: THERAPY  Noted: 2021  Today's Date: 2021  Patient seen for 4 sessions             Subjective   Lauren Aby reports: shoulder still really bothering her, even down towards the elbow.     Currently: 3/10 pain.     Objective   Limited to less than 90deg of AAROM Shoulder Flexion with cane.     See Exercise, Manual, and Modality Logs for complete treatment.       Assessment/Plan  Lauren still experiencing increased R shoulder pain, especially when reaching up overhead and reaching behind. Pt had some increased discomfort with PROM and AAROM. Pt would benefit from skilled PT to address Range of Motion  and Strength deficits, pain management and any concerns with ADLs.       Progress per Plan of Care           Timed:  Manual Therapy:    10     mins  91536;  Therapeutic Exercise:    20     mins  12384;     Neuromuscular Damaso:        mins  88667;    Therapeutic Activity:          mins  68723;     Gait Training:           mins  46612;    Aquatic Therapy:          mins  15139;       Untimed:  Electrical Stimulation:         mins  29893 ( );  Mechanical Traction:         mins  56668;       Timed Treatment:   30   mins   Total Treatment:     30   mins      Electronically signed:   Destiny Conde PTA  Physical Therapist Assistant  [facility] License #: [id number]

## 2021-11-18 ENCOUNTER — OFFICE VISIT (OUTPATIENT)
Dept: ORTHOPEDIC SURGERY | Facility: CLINIC | Age: 51
End: 2021-11-18

## 2021-11-18 VITALS — WEIGHT: 204 LBS | BODY MASS INDEX: 32.02 KG/M2 | HEIGHT: 67 IN | OXYGEN SATURATION: 96 % | HEART RATE: 63 BPM

## 2021-11-18 DIAGNOSIS — M75.51 BURSITIS OF RIGHT SHOULDER: Primary | ICD-10-CM

## 2021-11-18 DIAGNOSIS — M75.81 ROTATOR CUFF TENDINITIS, RIGHT: ICD-10-CM

## 2021-11-18 PROCEDURE — 99213 OFFICE O/P EST LOW 20 MIN: CPT | Performed by: PHYSICIAN ASSISTANT

## 2021-11-18 NOTE — PROGRESS NOTES
"Chief Complaint  Pain of the Right Shoulder    Subjective          Lauren Badillo presents to [facility] for follow-up of right shoulder pain.  Patient was initially evaluated in clinic on 10/08/2021, which time she had been having pain of her shoulder since July.  She states shoulder pain progressed, therefore her PCP ordered MRI of her shoulder.  MRI was significant for rotator cuff tendinosis with partial articular sided tearing of the subscapularis tendon and partial bursal sided tearing of the supraspinatus tendon.  Patient has been using oral Voltaren, which she states gives some relief of her shoulder.  She was given right shoulder steroid injection at her initial visit which she states did not help with her shoulder pain.  She has attended several therapy sessions.  She states her motion is gone somewhat better but denies relief of pain.    Objective   No Known Allergies    Vital Signs:   Pulse 63   Ht 170.2 cm (67\")   Wt 92.5 kg (204 lb)   SpO2 96%   BMI 31.95 kg/m²       Physical Exam  Constitutional:       Appearance: Normal appearance. Patient is well-developed and normal weight.   HENT:      Head: Normocephalic.      Right Ear: Hearing and external ear normal.      Left Ear: Hearing and external ear normal.      Nose: Nose normal.   Eyes:      Conjunctiva/sclera: Conjunctivae normal.   Cardiovascular:      Rate and Rhythm: Normal rate.   Pulmonary:      Effort: Pulmonary effort is normal.      Breath sounds: No wheezing or rales.   Abdominal:      Palpations: Abdomen is soft.      Tenderness: There is no abdominal tenderness.   Musculoskeletal:      Cervical back: Normal range of motion.   Skin:     Findings: No rash.   Neurological:      Mental Status: Patient is alert and oriented to person, place, and time.   Psychiatric:         Mood and Affect: Mood and affect normal.         Judgment: Judgment normal.     Ortho Exam  Right shoulder: Sensation is intact, " neurovascular intact, radial and ulnar pulse are 2+. Tenderness of the AC joint. Tenderness along lateral shoulder. No swelling, atrophy or discoloration. Good muscle tone of the deltoid, biceps and triceps. AROM forward flexion to 130 degrees. Painful forward flexion of the shoulder.  Active abduction to 70 degrees. IR to L1. Full elbow and wrist flexion and extension.       Result Review :   The following data was reviewed by: GLENNA Ruelas on 11/18/2021:         Imaging Results (Most Recent)     None           Reviewed imaging and discussed diagnosis and treatment course with patient in clinic. Patient will continue progressing ROM in PT and use of anti-inflammatory medication. We discussed repeating injection in 2 months versus operative management. We will reevaluate interventions in one month and determine best course of treatment.     Assessment and Plan    Problem List Items Addressed This Visit        Musculoskeletal and Injuries    Bursitis - Primary    Rotator cuff tendinitis, right          Follow Up   Return in about 4 weeks (around 12/16/2021).  Patient Instructions   Patient will continue PT per POC and continue anti-inflammatory medication as needed.      Follow up in 1 month to reevaluate improvement.     Patient was given instructions and counseling regarding her condition or for health maintenance advice. Please see specific information pulled into the AVS if appropriate.

## 2021-11-18 NOTE — PATIENT INSTRUCTIONS
Patient will continue PT per POC and continue anti-inflammatory medication as needed.      Follow up in 1 month to reevaluate improvement.

## 2021-11-24 ENCOUNTER — TREATMENT (OUTPATIENT)
Dept: PHYSICAL THERAPY | Facility: CLINIC | Age: 51
End: 2021-11-24

## 2021-11-24 DIAGNOSIS — M75.51 BURSITIS OF RIGHT SHOULDER: Primary | ICD-10-CM

## 2021-11-24 DIAGNOSIS — M25.511 RIGHT SHOULDER PAIN, UNSPECIFIED CHRONICITY: ICD-10-CM

## 2021-11-24 PROCEDURE — 97110 THERAPEUTIC EXERCISES: CPT | Performed by: PHYSICAL THERAPIST

## 2021-11-24 PROCEDURE — 97140 MANUAL THERAPY 1/> REGIONS: CPT | Performed by: PHYSICAL THERAPIST

## 2021-11-24 NOTE — PROGRESS NOTES
Physical Therapy Daily Treatment Note      Patient: Lauren Badillo   : 1970  Referring practitioner: Yary Kern MD  Date of Initial Visit: Type: THERAPY  Noted: 2021  Today's Date: 2021  Patient seen for 5 sessions           Subjective Questionnaire:       Subjective Evaluation    History of Present Illness    Subjective comment: Pt presents to clinic with report of shoulder not being any better.  Pt reports thaat after PT on Thursday her pain got really bad and stayed till the next day when it decreased slightly.  Pt described said pain as achy burning.Pain  Current pain ratin           Objective   See Exercise, Manual, and Modality Logs for complete treatment.       Assessment/Plan    Visit Diagnoses:    ICD-10-CM ICD-9-CM   1. Bursitis of right shoulder  M75.51 726.10   2. Right shoulder pain, unspecified chronicity  M25.511 719.41       Progress per Plan of Care and Progress strengthening /stabilization /functional activity           Timed:  Manual Therapy:    10     mins  51692;  Therapeutic Exercise:    20     mins  60547;     Neuromuscular Damaso:        mins  36977;    Therapeutic Activity:          mins  08088;     Gait Training:           mins  28574;     Ultrasound:          mins  37227;    Electrical Stimulation:         mins  46501 ( );  Aquatic Therapy          mins  54038    Untimed:  Electrical Stimulation:         mins  45825 ( );  Mechanical Traction:         mins  61595;     Timed Treatment:   30   mins   Total Treatment:     30   mins    Electronically signed    Anne Ramos PTA  Physical Therapist Assistant    [place] license: [id number]

## 2021-11-30 ENCOUNTER — TREATMENT (OUTPATIENT)
Dept: PHYSICAL THERAPY | Facility: CLINIC | Age: 51
End: 2021-11-30

## 2021-11-30 ENCOUNTER — HOSPITAL ENCOUNTER (OUTPATIENT)
Dept: MAMMOGRAPHY | Facility: HOSPITAL | Age: 51
Discharge: HOME | End: 2021-11-30
Admitting: FAMILY MEDICINE

## 2021-11-30 DIAGNOSIS — M75.51 BURSITIS OF RIGHT SHOULDER: Primary | ICD-10-CM

## 2021-11-30 DIAGNOSIS — Z12.31 VISIT FOR SCREENING MAMMOGRAM: ICD-10-CM

## 2021-11-30 DIAGNOSIS — M25.511 RIGHT SHOULDER PAIN, UNSPECIFIED CHRONICITY: ICD-10-CM

## 2021-11-30 PROCEDURE — 97140 MANUAL THERAPY 1/> REGIONS: CPT | Performed by: PHYSICAL THERAPIST

## 2021-11-30 PROCEDURE — 77067 SCR MAMMO BI INCL CAD: CPT

## 2021-11-30 PROCEDURE — 77063 BREAST TOMOSYNTHESIS BI: CPT

## 2021-11-30 PROCEDURE — 97110 THERAPEUTIC EXERCISES: CPT | Performed by: PHYSICAL THERAPIST

## 2021-12-02 ENCOUNTER — TREATMENT (OUTPATIENT)
Dept: PHYSICAL THERAPY | Facility: CLINIC | Age: 51
End: 2021-12-02

## 2021-12-02 DIAGNOSIS — M25.511 RIGHT SHOULDER PAIN, UNSPECIFIED CHRONICITY: ICD-10-CM

## 2021-12-02 DIAGNOSIS — M75.51 BURSITIS OF RIGHT SHOULDER: Primary | ICD-10-CM

## 2021-12-02 PROCEDURE — 97110 THERAPEUTIC EXERCISES: CPT | Performed by: PHYSICAL THERAPIST

## 2021-12-02 PROCEDURE — 97140 MANUAL THERAPY 1/> REGIONS: CPT | Performed by: PHYSICAL THERAPIST

## 2021-12-02 NOTE — PROGRESS NOTES
Physical Therapy Progress Note      Patient: Lauren Badillo   : 1970  Referring practitioner: Yary Kern MD  Date of Initial Visit: Type: THERAPY  Noted: 2021  Today's Date: 2021  Patient seen for 7 sessions           Subjective   Lauren Badillo reports: right shoulder ache 3/10 pain rating  Subjective Questionnaire: QuickDASH: 18=1-19% limitation      Objective          Active Range of Motion     Right Shoulder   Flexion: 155 degrees   Abduction: 145 degrees   External rotation 90°: WFL  Internal rotation 45°: 60 degrees     Strength/Myotome Testing     Right Shoulder     Planes of Motion   Flexion: 4   Extension: 4   Abduction: 4-   External rotation at 0°: 4-   Internal rotation at 0°: 4-       See Exercise, Manual, and Modality Logs for complete treatment.       Assessment & Plan     Assessment  Impairments: abnormal or restricted ROM, activity intolerance, impaired physical strength and pain with function  Functional Limitations: carrying objects, lifting, uncomfortable because of pain and reaching overhead  Assessment details: Pt presents with limitations, noted by evaluation that impede patient's ability to use right shoulder/UE for ADLs.  The skills of a therapist will be required to safely and effectively implement the following treatment plan to restore maximal level of function.    Prognosis: good  Prognosis details: Patient has made significant improvement in range of motion and slight improvement in strength of right shoulder.  Patient still demonstrates deficits that hinder patient's ability to use right shoulder (dominate UE) for ADLs.  Patient states specifically pain with reaching behind her into back seat of car and abduction lifting.     Goals  Plan Goals: 1. The patient has limited ROM of the right shoulder.    LTG 1: 8 weeks:  The patient will demonstrate 160 degrees of right shoulder flexion, 140 degrees of shoulder abduction  to allow the patient to reach into upper  kitchen cabinets.   STATUS:  progressing  STG 1a: 4 weeks:  The patient will demonstrate 150 degrees of right shoulder flexion, 100 degrees of shoulder abduction.  STATUS: Met     2. The patient has limited strength of the right shoulder.   LTG 2: 8 weeks:  The patient will demonstrate 4+/5 strength for right shoulder flexion, abduction, external rotation, and internal rotation in order to demonstrate improved shoulder stability.   STATUS:  ongoing  STG 2a: 4 weeks:  The patient will demonstrate 4/5 strength for right shoulder flexion, abduction, external rotation, and internal rotation.   STATUS: progressing  STG2b:  2 weeks:  The patient will be independent with home exercises.    STATUS:  ongoing     3. The patient complains of pain to the right shoulder.   LTG 3: 8 weeks:  The patient will report a pain rating of 2/10 or better in order to improve sleep quality and tolerance to performance of activities of daily living.   STATUS: progressing  STG 3a: 4 weeks:  The patient will report a pain rating of 4/10 or better.    STATUS:  Met     4. Carrying, Moving, and Handling Objects Functional Limitation     LTG 4: 8 weeks:  The patient will demonstrate 1-19% limitation by achieving a score of 15 on the Quick DASH.   STATUS:  Progressing  STG 4a: 4 weeks:  The patient will demonstrate 20-39% limitation by achieving a score of 24 on the Quick DASH.     STATUS: Met     TREATMENT: Manual therapy, therapeutic exercise, home exercise instruction, and modalities as needed to include: electrical stimulation, ultrasound, moist heat, and ice.       Plan  Therapy options: will be seen for skilled therapy services  Planned modality interventions: cryotherapy, TENS and thermotherapy (hydrocollator packs)  Planned therapy interventions: strengthening, spinal/joint mobilization, manual therapy, functional ROM exercises, home exercise program, flexibility and postural training  Frequency: 2x week  Duration in weeks: 4  Treatment  plan discussed with: patient        Visit Diagnoses:    ICD-10-CM ICD-9-CM   1. Bursitis of right shoulder  M75.51 726.10   2. Right shoulder pain, unspecified chronicity  M25.511 719.41       Progress per Plan of Care           Timed:  Manual Therapy:    12     mins  25406;  Therapeutic Exercise:    16     mins  93633;     Neuromuscular Damaso:        mins  73042;    Therapeutic Activity:          mins  04309;     Gait Training:           mins  30577;     Ultrasound:          mins  65188;    Electrical Stimulation:         mins  27061 ( );    Untimed:  Electrical Stimulation:         mins  44560 ( );  Mechanical Traction:         mins  21274;     Timed Treatment:   28   mins   Total Treatment:     28   mins  Delphine Hughes PT    Electronically singed 12/2/2021      [place] PT license: [id number]  Physical Therapist

## 2021-12-15 ENCOUNTER — TREATMENT (OUTPATIENT)
Dept: PHYSICAL THERAPY | Facility: CLINIC | Age: 51
End: 2021-12-15

## 2021-12-15 DIAGNOSIS — M75.51 BURSITIS OF RIGHT SHOULDER: Primary | ICD-10-CM

## 2021-12-15 DIAGNOSIS — M25.511 RIGHT SHOULDER PAIN, UNSPECIFIED CHRONICITY: ICD-10-CM

## 2021-12-15 PROCEDURE — 97110 THERAPEUTIC EXERCISES: CPT | Performed by: PHYSICAL THERAPIST

## 2021-12-15 PROCEDURE — 97140 MANUAL THERAPY 1/> REGIONS: CPT | Performed by: PHYSICAL THERAPIST

## 2021-12-15 NOTE — PROGRESS NOTES
Physical Therapy Daily Progress Note / Discharge    Patient: Lauren Badillo   : 1970  Diagnosis/ICD-10 Code:  Bursitis of right shoulder [M75.51]  Referring practitioner: Aly Mcdonald DO  Date of Initial Visit: Type: THERAPY  Noted: 2021  Today's Date: 12/15/2021  Patient seen for 8 sessions           Subjective   The patient reported that her shoulder pain is a 3/10 today. She can tell that her shoulder has improved since starting PT, but still has a way to go. She will need to discharge from PT after today as she is moving to [place].    Objective   See Exercise, Manual, and Modality Logs for complete treatment.     Assessment/Plan  The patient demonstrated good tolerance to all interventions today. She has progressed some with PT as indicated in her re-evaluation from 21. She would benefit from continued PT, but will be discharged at this time due to the patient moving to [place].       Timed:  Manual Therapy:    6     mins  83561;  Therapeutic Exercise:    19     mins  38054;     Neuromuscular Damaso:   0    mins  00354;    Therapeutic Activity:     0     mins  36928;     Gait Trainin     mins  48160;     Aquatics                         0      mins  12399    Un-timed:  Mechanical Traction      0     mins  27119  Dry Needling     0     mins self-pay  Electrical Stimulation:    0     mins  95636 ( );      Timed Treatment:   25   mins   Total Treatment:     25   mins    Francis Mead PT  Physical Therapist    Electronically signed 12/15/2021    [place] License: PT - [id number]

## 2021-12-17 ENCOUNTER — OFFICE VISIT (OUTPATIENT)
Dept: ORTHOPEDIC SURGERY | Facility: CLINIC | Age: 51
End: 2021-12-17

## 2021-12-17 VITALS — WEIGHT: 204.8 LBS | HEART RATE: 71 BPM | BODY MASS INDEX: 32.15 KG/M2 | HEIGHT: 67 IN | OXYGEN SATURATION: 98 %

## 2021-12-17 DIAGNOSIS — M75.81 ROTATOR CUFF TENDINITIS, RIGHT: ICD-10-CM

## 2021-12-17 DIAGNOSIS — M75.51 BURSITIS OF RIGHT SHOULDER: Primary | ICD-10-CM

## 2021-12-17 PROCEDURE — 99212 OFFICE O/P EST SF 10 MIN: CPT | Performed by: PHYSICIAN ASSISTANT

## 2021-12-17 RX ORDER — MELOXICAM 15 MG/1
15 TABLET ORAL DAILY
Qty: 30 TABLET | Refills: 0 | Status: SHIPPED | OUTPATIENT
Start: 2021-12-17

## 2021-12-17 NOTE — PROGRESS NOTES
"Chief Complaint  Follow-up of the Right Shoulder    Subjective          Lauren Badillo presents to [facility] for follow-up of right shoulder pain.  Patient has been attending physical therapy for rotator cuff tendinosis and partial articular sided tearing of subscapularis and supraspinatus tendons, as confirmed by MRI.  She states she has progressed her range of motion, strength and management of her pain.  She states certain motions, such as reaching behind her back and abduction, still give her pain.  However patient will be moving to [place] at the end of this month and will be discharging from .  She request steroid injection today.  Patient's last steroid injection was 10/08/2021, therefore she is unable to receive this.    Objective   No Known Allergies    Vital Signs:   Pulse 71   Ht 170.2 cm (67\")   Wt 92.9 kg (204 lb 12.8 oz)   SpO2 98%   BMI 32.08 kg/m²       Physical Exam  Constitutional:       Appearance: Normal appearance. Patient is well-developed and normal weight.   HENT:      Head: Normocephalic.      Right Ear: Hearing and external ear normal.      Left Ear: Hearing and external ear normal.      Nose: Nose normal.   Eyes:      Conjunctiva/sclera: Conjunctivae normal.   Cardiovascular:      Rate and Rhythm: Normal rate.   Pulmonary:      Effort: Pulmonary effort is normal.      Breath sounds: No wheezing or rales.   Abdominal:      Palpations: Abdomen is soft.      Tenderness: There is no abdominal tenderness.   Musculoskeletal:      Cervical back: Normal range of motion.   Skin:     Findings: No rash.   Neurological:      Mental Status: Patient is alert and oriented to person, place, and time.   Psychiatric:         Mood and Affect: Mood and affect normal.         Judgment: Judgment normal.     Ortho Exam  Right shoulder: Sensation intact, neurovascular intact, radial and ulnar pulse are 2+.  Tenderness over subacromial bursa and lateral shoulder.  No swelling, " discoloration, atrophy or deformity.  Muscle tone of the deltoid, biceps, triceps muscles.  Active forward flexion to 150 degrees.  Active abduction to 90 degrees, with pain elicited.  Internal rotation to L1.  Full elbow and wrist flexion extension.    Result Review :   The following data was reviewed by: GLENNA Ruelas on 12/17/2021:         Imaging Results (Most Recent)     None                Assessment and Plan    Problem List Items Addressed This Visit        Musculoskeletal and Injuries    Bursitis - Primary    Rotator cuff tendinitis, right      Discussed treatment options with patient today in clinic.  She is unable to receive steroid injection, due to 2-month time span in between her last injection.  We switched her medication to a new anti-inflammatory medication today.  Patient was instructed to not mix the 2 together.  Patient will be moving in the next month, therefore she may follow-up as needed.        Follow Up   Return if symptoms worsen or fail to improve.  There are no Patient Instructions on file for this visit.  Patient was given instructions and counseling regarding her condition or for health maintenance advice. Please see specific information pulled into the AVS if appropriate.